# Patient Record
Sex: MALE | Race: WHITE | NOT HISPANIC OR LATINO | Employment: OTHER | URBAN - NONMETROPOLITAN AREA
[De-identification: names, ages, dates, MRNs, and addresses within clinical notes are randomized per-mention and may not be internally consistent; named-entity substitution may affect disease eponyms.]

---

## 2021-11-12 ENCOUNTER — TRANSCRIBE ORDERS (OUTPATIENT)
Dept: LAB | Facility: HOSPITAL | Age: 71
End: 2021-11-12

## 2021-11-12 DIAGNOSIS — Z01.818 OTHER SPECIFIED PRE-OPERATIVE EXAMINATION: Primary | ICD-10-CM

## 2021-11-15 ENCOUNTER — LAB (OUTPATIENT)
Dept: LAB | Facility: HOSPITAL | Age: 71
End: 2021-11-15

## 2021-11-15 DIAGNOSIS — Z01.818 OTHER SPECIFIED PRE-OPERATIVE EXAMINATION: ICD-10-CM

## 2021-11-15 LAB — SARS-COV-2 RNA PNL SPEC NAA+PROBE: NOT DETECTED

## 2021-11-15 PROCEDURE — C9803 HOPD COVID-19 SPEC COLLECT: HCPCS

## 2021-11-15 PROCEDURE — U0004 COV-19 TEST NON-CDC HGH THRU: HCPCS | Performed by: OPHTHALMOLOGY

## 2021-11-24 ENCOUNTER — TRANSCRIBE ORDERS (OUTPATIENT)
Dept: ADMINISTRATIVE | Facility: HOSPITAL | Age: 71
End: 2021-11-24

## 2021-11-24 DIAGNOSIS — Z01.818 PRE-OPERATIVE CLEARANCE: Primary | ICD-10-CM

## 2021-11-29 ENCOUNTER — LAB (OUTPATIENT)
Dept: LAB | Facility: HOSPITAL | Age: 71
End: 2021-11-29

## 2021-11-29 DIAGNOSIS — Z01.818 PRE-OPERATIVE CLEARANCE: ICD-10-CM

## 2021-11-29 LAB — SARS-COV-2 RNA PNL SPEC NAA+PROBE: NOT DETECTED

## 2021-11-29 PROCEDURE — U0004 COV-19 TEST NON-CDC HGH THRU: HCPCS | Performed by: OPHTHALMOLOGY

## 2021-11-29 PROCEDURE — C9803 HOPD COVID-19 SPEC COLLECT: HCPCS

## 2025-05-08 ENCOUNTER — OFFICE VISIT (OUTPATIENT)
Dept: UROLOGY | Facility: CLINIC | Age: 75
End: 2025-05-08
Payer: MEDICARE

## 2025-05-08 VITALS
HEART RATE: 76 BPM | HEIGHT: 72 IN | DIASTOLIC BLOOD PRESSURE: 71 MMHG | SYSTOLIC BLOOD PRESSURE: 138 MMHG | WEIGHT: 177.6 LBS | BODY MASS INDEX: 24.06 KG/M2

## 2025-05-08 DIAGNOSIS — R97.20 ELEVATED PROSTATE SPECIFIC ANTIGEN (PSA): Primary | ICD-10-CM

## 2025-05-08 RX ORDER — SODIUM PHOSPHATE,MONO-DIBASIC 19G-7G/118
ENEMA (ML) RECTAL
Qty: 1 ENEMA | Refills: 0 | Status: SHIPPED | OUTPATIENT
Start: 2025-05-08

## 2025-05-08 RX ORDER — DIAZEPAM 10 MG/1
TABLET ORAL
Qty: 2 TABLET | Refills: 0 | Status: SHIPPED | OUTPATIENT
Start: 2025-05-08

## 2025-05-08 RX ORDER — CLINDAMYCIN HYDROCHLORIDE 300 MG/1
300 CAPSULE ORAL 2 TIMES DAILY
Qty: 6 CAPSULE | Refills: 0 | Status: SHIPPED | OUTPATIENT
Start: 2025-05-08 | End: 2025-05-11

## 2025-05-08 RX ORDER — CIPROFLOXACIN 500 MG/1
TABLET, FILM COATED ORAL
Qty: 4 TABLET | Refills: 0 | Status: SHIPPED | OUTPATIENT
Start: 2025-05-08

## 2025-05-08 NOTE — PROGRESS NOTES
"Chief Complaint:    Chief Complaint   Patient presents with    Elevated PSA     New patient        Vital Signs:   /71 (BP Location: Right arm, Patient Position: Sitting, Cuff Size: Large Adult)   Pulse 76   Ht 182.9 cm (72\")   Wt 80.6 kg (177 lb 9.6 oz)   BMI 24.09 kg/m²   Body mass index is 24.09 kg/m².      HPI:  Den[place] Clayton Eaton is a 74 y.o. male who presents today for initial evaluation     History of Present Illness  Mr. Jerome presents to the clinic today for evaluation of an elevated PSA.  He has been referred to us by Dr. Zoraida Miranda MD.  Patient reports that in October 2024 and went to his primary care provider due to left-sided leg pain and she completed blood work at that time including a PSA that did come back elevated at 7.3.  He did not believe it had been elevated previously but is unsure.  Did have a repeat PSA recently on 3/31/2024 that it increased further to 8.9.  He reports that his leg pain has improved.  Currently has an IPSS score 3.  He only gets up 1 time throughout the night endorses a slight weak stream with urgency.  Overall he is mostly satisfied with slow urinary tract symptoms.  He denies any fever, chills, unexplained weight loss, or gross hematuria/hematospermia.  He denies any known family history of prostate, ovarian, breast, or cervical carcinoma.      Past Medical History:  Past Medical History:   Diagnosis Date    Elevated PSA        Current Meds:  Current Outpatient Medications   Medication Sig Dispense Refill    ciprofloxacin (Cipro) 500 MG tablet Take one tablet twice a day before procedure 4 tablet 0    clindamycin (CLEOCIN) 300 MG capsule Take 1 capsule by mouth 2 (Two) Times a Day for 3 days. Start after procedure 6 capsule 0    diazePAM (VALIUM) 10 MG tablet One tablet night before procedure at bedtime and one morning of one hour prior to procedure 2 tablet 0    Sodium Phosphates ([employer] Enema Disposable) 19-7 GM/118ML enema Use morning of the procedure 1 " enema 0     No current facility-administered medications for this visit.        Allergies:   No Known Allergies     Past Surgical History:  Past Surgical History:   Procedure Laterality Date    SKIN CANCER EXCISION         Social History:  Social History     Socioeconomic History    Marital status: Single   Tobacco Use    Smoking status: Every Day     Types: Cigarettes    Smokeless tobacco: Current     Types: Snuff   Vaping Use    Vaping status: Never Used   Substance and Sexual Activity    Alcohol use: Not Currently    Drug use: Never    Sexual activity: Defer       Family History:  Family History   Problem Relation Age of Onset    No Known Problems Father     No Known Problems Mother        Review of Systems:  Review of Systems   Constitutional:  Negative for chills, fatigue, fever and unexpected weight change.   HENT:  Negative for congestion and sinus pressure.    Respiratory:  Negative for chest tightness and shortness of breath.    Cardiovascular:  Negative for chest pain.   Gastrointestinal:  Negative for abdominal pain, constipation, diarrhea, nausea and vomiting.   Genitourinary:  Negative for difficulty urinating, dysuria, flank pain, frequency, hematuria, penile swelling, scrotal swelling and urgency.   Musculoskeletal:  Negative for back pain and neck pain.   Skin:  Negative for rash.   Allergic/Immunologic: Negative for food allergies.   Neurological:  Negative for dizziness and headaches.   Hematological:  Does not bruise/bleed easily.   Psychiatric/Behavioral:  Negative for confusion and suicidal ideas. The patient is nervous/anxious.        Physical Exam:  Physical Exam  Constitutional:       General: He is not in acute distress.     Appearance: Normal appearance.   HENT:      Head: Normocephalic and atraumatic.      Nose: Nose normal.      Mouth/Throat:      Mouth: Mucous membranes are moist.   Eyes:      Conjunctiva/sclera: Conjunctivae normal.   Cardiovascular:      Rate and Rhythm: Normal rate.       Pulses: Normal pulses.   Pulmonary:      Effort: Pulmonary effort is normal.   Abdominal:      Palpations: Abdomen is soft.   Genitourinary:     Comments: Enlarged prostate with hardness to palpation on the right side.  Concerning for mass.  Musculoskeletal:         General: Normal range of motion.      Cervical back: Normal range of motion.   Skin:     General: Skin is warm.   Neurological:      General: No focal deficit present.      Mental Status: He is alert and oriented to person, place, and time.   Psychiatric:         Mood and Affect: Mood normal.         Behavior: Behavior normal.         Thought Content: Thought content normal.         Judgment: Judgment normal.         IPSS Questionnaire (AUA-7):  IPSS Questionnaire (AUA-7):                  IPSS Questionnaire (AUA-7):  Over the past month…    1)  Incomplete Emptying  How often have you had a sensation of not emptying your bladder?  0 - Not at all   2)  Frequency  How often have you had to urinate less than every two hours? 1 - Less than 1 time in 5   3)  Intermittency  How often have you found you stopped and started again several times when you urinated?  0 - Not at all   4) Urgency  How often have you found it difficult to postpone urination?  0 - Not at all   5) Weak Stream  How often have you had a weak urinary stream?  1 - Less than 1 time in 5   6) Straining  How often have you had to push or strain to begin urination?  0 - Not at all   7) Nocturia  How many times did you typically get up at night to urinate?  1 - 1 time   Total Score:  3   The International Prostate Symptom Score (IPSS) is used to screen, diagnose, track symptoms of benign prostatic hyperplasia (BPH).    0-7 pts (Mild Symptoms)  / 8-19 pts (Moderate) / 20-35 (Severe)    Quality of life due to urinary symptoms:  If you were to spend the rest of your life with your urinary condition the way it is now, how would you feel about that? 2-Mostly Satisfied   Urine Leakage  (Incontinence) 0-No Leakage       Recent Image (CT and/or KUB):   CT Abdomen and Pelvis: No results found for this or any previous visit.     CT Stone Protocol: No results found for this or any previous visit.     KUB: No results found for this or any previous visit.       Labs:  Brief Urine Lab Results       None          No visits with results within 3 Month(s) from this visit.   Latest known visit with results is:   Lab on 11/29/2021   Component Date Value Ref Range Status    COVID19 11/29/2021 Not Detected  Not Detected - Ref. Range Final        Procedure: None  Procedures     I have reviewed and agree with the above PMH, PSH, FMH, social history, medications, allergies, and labs.     Assessment/Plan:   Problem List Items Addressed This Visit       Elevated prostate specific antigen (PSA) - Primary    Relevant Medications    Sodium Phosphates ([employer] Enema Disposable) 19-7 GM/118ML enema    diazePAM (VALIUM) 10 MG tablet    clindamycin (CLEOCIN) 300 MG capsule    ciprofloxacin (Cipro) 500 MG tablet       Health Maintenance:   Health Maintenance Due   Topic Date Due    Pneumococcal Vaccine 50+ (1 of 2 - PCV) Never done    TDAP/TD VACCINES (1 - Tdap) Never done    COLORECTAL CANCER SCREENING  Never done    ZOSTER VACCINE (1 of 2) Never done    AAA SCREEN ONCE  Never done    COVID-19 Vaccine (1 - 2024-25 season) Never done    HEPATITIS C SCREENING  Never done    ANNUAL WELLNESS VISIT  Never done        Smoking Counseling: Everyday smoker.  Current user of smokeless tobacco.  Counseling given having already quit at this time.    Urine Incontinence: Patient reports that he is not currently experiencing any symptoms of urinary incontinence.    Patient was given instructions and counseling regarding his condition or for health maintenance advice. Please see specific information pulled into the AVS if appropriate.    Patient Education:   Elevated PSA -discussed with the patient the pathophysiology of this condition in  detail.  Patient has had persistent elevation in his PSA and increased 0.9.  On digital rectal exam he had an very hard mass on the right posterior aspect of the prostate concerning for carcinoma.  Did advise patient given persistent increase in rise this is concerning for prostate cancer would recommend a biopsy.  Did discuss the nature of a biopsy in office including procedure.  Did discuss the use of antibiotics for prophylaxis, enema to clean out the rectal vault, and Valium for preop use for anxiety and nerves.  Patient is not currently on any blood thinners or anticoagulants.  He does wish to proceed forward with the biopsy in office and we will get him scheduled next week.    Visit Diagnoses:    ICD-10-CM ICD-9-CM   1. Elevated prostate specific antigen (PSA)  R97.20 790.93     A total of 30 minutes were spent coordinating this patient’s care in clinic today; 20 minutes of which were face-to-face with the patient, reviewing medical history and counseling on the current treatment and followup plan.  All questions were answered to patient's satisfaction.    Meds Ordered During Visit:  New Medications Ordered This Visit   Medications    Sodium Phosphates ([employer] Enema Disposable) 19-7 GM/118ML enema     Sig: Use morning of the procedure     Dispense:  1 enema     Refill:  0    diazePAM (VALIUM) 10 MG tablet     Sig: One tablet night before procedure at bedtime and one morning of one hour prior to procedure     Dispense:  2 tablet     Refill:  0    clindamycin (CLEOCIN) 300 MG capsule     Sig: Take 1 capsule by mouth 2 (Two) Times a Day for 3 days. Start after procedure     Dispense:  6 capsule     Refill:  0    ciprofloxacin (Cipro) 500 MG tablet     Sig: Take one tablet twice a day before procedure     Dispense:  4 tablet     Refill:  0       Follow Up Appointment: Next week for an office biopsy  No follow-ups on file.      This document has been electronically signed by Jay Villatoro PA-C   May 8, 2025 12:35  EDT    Part of this note may be an electronic transcription/translation of spoken language to printed text using the Dragon Dictation System.

## 2025-05-15 ENCOUNTER — PROCEDURE VISIT (OUTPATIENT)
Dept: UROLOGY | Facility: CLINIC | Age: 75
End: 2025-05-15
Payer: MEDICARE

## 2025-05-15 VITALS
WEIGHT: 176 LBS | BODY MASS INDEX: 23.84 KG/M2 | DIASTOLIC BLOOD PRESSURE: 72 MMHG | HEART RATE: 72 BPM | HEIGHT: 72 IN | SYSTOLIC BLOOD PRESSURE: 125 MMHG

## 2025-05-15 DIAGNOSIS — Z48.816 SURGICAL AFTERCARE, GENITOURINARY SYSTEM: ICD-10-CM

## 2025-05-15 DIAGNOSIS — R97.20 ELEVATED PROSTATE SPECIFIC ANTIGEN (PSA): Primary | ICD-10-CM

## 2025-05-15 RX ORDER — CLINDAMYCIN HYDROCHLORIDE 300 MG/1
CAPSULE ORAL
COMMUNITY
Start: 2025-05-13

## 2025-05-15 RX ORDER — GENTAMICIN 40 MG/ML
80 INJECTION, SOLUTION INTRAMUSCULAR; INTRAVENOUS ONCE
Status: COMPLETED | OUTPATIENT
Start: 2025-05-15 | End: 2025-05-15

## 2025-05-15 RX ADMIN — GENTAMICIN 80 MG: 40 INJECTION, SOLUTION INTRAMUSCULAR; INTRAVENOUS at 09:41

## 2025-05-15 NOTE — PROGRESS NOTES
Chief Complaint:      Chief Complaint   Patient presents with    prostate biopsy        HPI:   74 y.o. male.  Here for biopsy PSA is 8.9  Past Medical History:     Past Medical History:   Diagnosis Date    Elevated PSA        Current Meds:     Current Outpatient Medications   Medication Sig Dispense Refill    ciprofloxacin (Cipro) 500 MG tablet Take one tablet twice a day before procedure 4 tablet 0    clindamycin (CLEOCIN) 300 MG capsule       diazePAM (VALIUM) 10 MG tablet One tablet night before procedure at bedtime and one morning of one hour prior to procedure 2 tablet 0    Sodium Phosphates ([employer] Enema Disposable) 19-7 GM/118ML enema Use morning of the procedure 1 enema 0     No current facility-administered medications for this visit.        Allergies:      No Known Allergies     Past Surgical History:     Past Surgical History:   Procedure Laterality Date    SKIN CANCER EXCISION         Social History:     Social History     Socioeconomic History    Marital status: Single   Tobacco Use    Smoking status: Every Day     Types: Cigarettes     Passive exposure: Current    Smokeless tobacco: Current     Types: Snuff   Vaping Use    Vaping status: Never Used   Substance and Sexual Activity    Alcohol use: Not Currently    Drug use: Never    Sexual activity: Defer       Family History:     Family History   Problem Relation Age of Onset    No Known Problems Father     No Known Problems Mother        Review of Systems:     Review of Systems   Constitutional: Negative.    HENT: Negative.     Eyes: Negative.    Respiratory: Negative.     Cardiovascular: Negative.    Gastrointestinal: Negative.    Endocrine: Negative.    Musculoskeletal: Negative.    Allergic/Immunologic: Negative.    Neurological: Negative.    Hematological: Negative.    Psychiatric/Behavioral: Negative.         Physical Exam:     Physical Exam  Vitals and nursing note reviewed.   Constitutional:       Appearance: He is well-developed.   HENT:       Head: Normocephalic and atraumatic.   Eyes:      Conjunctiva/sclera: Conjunctivae normal.      Pupils: Pupils are equal, round, and reactive to light.   Cardiovascular:      Rate and Rhythm: Normal rate and regular rhythm.      Heart sounds: Normal heart sounds.   Pulmonary:      Effort: Pulmonary effort is normal.      Breath sounds: Normal breath sounds.   Abdominal:      General: Bowel sounds are normal.      Palpations: Abdomen is soft.   Musculoskeletal:         General: Normal range of motion.      Cervical back: Normal range of motion.   Skin:     General: Skin is warm and dry.   Neurological:      Mental Status: He is alert and oriented to person, place, and time.      Deep Tendon Reflexes: Reflexes are normal and symmetric.   Psychiatric:         Behavior: Behavior normal.         Thought Content: Thought content normal.         Judgment: Judgment normal.         I have reviewed the following portions of the patient's history: Allergies, current medications, past family history, past medical history, past social history, past surgical history, problem list, and ROS and confirm it is accurate.    Recent Image (CT and/or KUB):      CT Abdomen and Pelvis: No results found for this or any previous visit.       CT Stone Protocol: No results found for this or any previous visit.       KUB: No results found for this or any previous visit.       Labs (past 3 months):      No visits with results within 3 Month(s) from this visit.   Latest known visit with results is:   Lab on 11/29/2021   Component Date Value Ref Range Status    COVID19 11/29/2021 Not Detected  Not Detected - Ref. Range Final        Procedure:   Prostate ultrasound and biopsy:  74 year-old white male with a history of an elevated PSA and a significant increase in his risk for prostate cancer.  We discussed the prostate biopsy at length.  We discussed the significant risks of anesthesia, bleeding, infection, urosepsis, purported effects on erectile  function, and rectal bleeding requiring surgical intervention.  He was given a pre-procedural enema.  He was pretreated with ciprofloxacin for 3 days.  He was given periprocedural gentamicin at the dose of 80 mg in an intramuscular fashion and given post biopsy clindamycin for 3 days.  Following an extensive informed consent, he was brought to the operative suite, placed in the left lateral decubitus position.  He was given his prophylactic gentamicin.  The rectum was anesthetized with 20 mL of 2% viscous Xylocaine jelly.  I then used the BK biplanar probe inserted into the rectum and made measurements of the prostate.  The height was 4.53 cm, the width was 4.92 cm, and the length was 5.85 cm for a volume of 67.79 g.  There were no obvious hypoechoic areas.  There was no intravesical median lobe.  There was minimal calcification between the transition and peripheral zone.  I then injected 20 mL of 1% Xylocaine in the perirectal area and raised of skin wheal to provide anesthesia after an adequate period of topical anesthesia. I used the Biopty gun to take a total of 10 cores without complication.  He tolerated this extremely well.  Cores.  There was no bleeding or complications.   Impression: Elevated PSA s/p biopsy.    Assessment/Plan:   Elevated prostate specific antigen-we discussed the diagnosis of elevated prostate-specific antigen.  I explained the pathophysiology of PSA.  It is a serine protease that's function in the male reproductive tract is to facilitate the liquefaction of semen.  It is for this reason the body does not want it freely floating in the serum and why typically bound tightly to albumin.  We discussed why we used both a PSA free and total to determine the need for more aggressive therapy. I discussed the normal range.  Additionally, it was in the range of 1 to 4, but more recently has been downgraded to something less than 2 or even approaching 1.  I discussed the risk of family history,  particularly the fact that the average male has a 14% risk of prostate cancer and that in the face of a positive diagnosis in a father it will tablet and any other first-generation relative continued tablet insofar that a father and brother with prostate cancer will produce almost a 50% risk of prostate cancer.  I discussed the use of the temporal use of PSA as the best option for monitoring.  We also discussed the fact that an elevated PSA is an isolated event does not mean that this is prostate cancer and should not engender worry in this regard. I discussed other things that can elevate PSA including constipation, prostatitis, infection, recent intercourse etc., as well as the risks and benefits associated with this.  Also discussed the fact that this is with a dilutional test and as a consequence of such were present produce slight variations on a single specimen.  Further discussed the risks and benefits of a prostate biopsy as well.        This document has been electronically signed by MIGUEL CHRIS MD May 15, 2025 08:40 EDT    Dictated Utilizing Dragon Dictation: Part of this note may be an electronic transcription/translation of spoken language to printed text using the Dragon Dictation System.

## 2025-05-16 LAB — REF LAB TEST METHOD: NORMAL

## 2025-05-22 ENCOUNTER — OFFICE VISIT (OUTPATIENT)
Dept: UROLOGY | Facility: CLINIC | Age: 75
End: 2025-05-22
Payer: MEDICARE

## 2025-05-22 VITALS
HEIGHT: 72 IN | SYSTOLIC BLOOD PRESSURE: 133 MMHG | BODY MASS INDEX: 24.27 KG/M2 | DIASTOLIC BLOOD PRESSURE: 72 MMHG | HEART RATE: 80 BPM | WEIGHT: 179.2 LBS

## 2025-05-22 DIAGNOSIS — R97.20 ELEVATED PROSTATE SPECIFIC ANTIGEN (PSA): Primary | ICD-10-CM

## 2025-05-22 NOTE — PROGRESS NOTES
Chief Complaint:      Chief Complaint   Patient presents with    Prostate Biopsy Results       HPI:   74 y.o. male here to discuss his biopsy there were no complications.  He had a 69 g prostate.  He had a negative biopsy.  I will send this for connect test.    Past Medical History:     Past Medical History:   Diagnosis Date    Elevated PSA        Current Meds:     Current Outpatient Medications   Medication Sig Dispense Refill    ciprofloxacin (Cipro) 500 MG tablet Take one tablet twice a day before procedure (Patient not taking: Reported on 5/22/2025) 4 tablet 0    clindamycin (CLEOCIN) 300 MG capsule  (Patient not taking: Reported on 5/22/2025)      diazePAM (VALIUM) 10 MG tablet One tablet night before procedure at bedtime and one morning of one hour prior to procedure (Patient not taking: Reported on 5/22/2025) 2 tablet 0    Sodium Phosphates ([facility] Enema Disposable) 19-7 GM/118ML enema Use morning of the procedure (Patient not taking: Reported on 5/22/2025) 1 enema 0     No current facility-administered medications for this visit.        Allergies:      No Known Allergies     Past Surgical History:     Past Surgical History:   Procedure Laterality Date    SKIN CANCER EXCISION         Social History:     Social History     Socioeconomic History    Marital status: Single   Tobacco Use    Smoking status: Every Day     Types: Cigarettes     Passive exposure: Current    Smokeless tobacco: Current     Types: Snuff   Vaping Use    Vaping status: Never Used   Substance and Sexual Activity    Alcohol use: Not Currently    Drug use: Never    Sexual activity: Defer       Family History:     Family History   Problem Relation Age of Onset    No Known Problems Father     No Known Problems Mother        Review of Systems:     Review of Systems   Constitutional: Negative.    HENT: Negative.     Eyes: Negative.    Respiratory: Negative.     Cardiovascular: Negative.    Gastrointestinal: Negative.    Endocrine: Negative.     Musculoskeletal: Negative.    Allergic/Immunologic: Negative.    Neurological: Negative.    Hematological: Negative.    Psychiatric/Behavioral: Negative.         Physical Exam:     Physical Exam  Vitals and nursing note reviewed.   Constitutional:       Appearance: He is well-developed.   HENT:      Head: Normocephalic and atraumatic.   Eyes:      Conjunctiva/sclera: Conjunctivae normal.      Pupils: Pupils are equal, round, and reactive to light.   Cardiovascular:      Rate and Rhythm: Normal rate and regular rhythm.      Heart sounds: Normal heart sounds.   Pulmonary:      Effort: Pulmonary effort is normal.      Breath sounds: Normal breath sounds.   Abdominal:      General: Bowel sounds are normal.      Palpations: Abdomen is soft.   Musculoskeletal:         General: Normal range of motion.      Cervical back: Normal range of motion.   Skin:     General: Skin is warm and dry.   Neurological:      Mental Status: He is alert and oriented to person, place, and time.      Deep Tendon Reflexes: Reflexes are normal and symmetric.   Psychiatric:         Behavior: Behavior normal.         Thought Content: Thought content normal.         Judgment: Judgment normal.         I have reviewed the following portions of the patient's history: Allergies, current medications, past family history, past medical history, past social history, past surgical history, problem list, and ROS and confirm it is accurate.    Recent Image (CT and/or KUB):      CT Abdomen and Pelvis: No results found for this or any previous visit.       CT Stone Protocol: No results found for this or any previous visit.       KUB: No results found for this or any previous visit.       Labs (past 3 months):      Procedure visit on 05/15/2025   Component Date Value Ref Range Status    Reference Lab Report 05/15/2025    Final                    Value:[facility]  [address] [place] [address]  Phone: [phone] or  [phone]  Fax: [phone]  Eduardo Braxton M.D., Medical Director    PATIENT NAME                                     LABORATORY NO.  [address] [place] [address].                           [id number]  [id number]                                 AGE                    SEX   N              CLIENT REF #  [facility]                             74        1950           [id number]      [id number]    GASTROENTEROLOGY & UROLOGY                 REQUESTING M.D.           ATTENDING M.D.         COPY TO.  [place] CHRIS,  [address] [place], [place] [address] [address]                           DATE COLLECTED            DATE RECEIVED          DATE REPORTED  05/15/2025                05/15/2025             2025    DIAGNOSIS:  A.     PROSTATE, NEEDLE CORE BIOPSY, RIGHT MID:  Benign prostatic tissue  B.                               PROSTATE, NEEDLE CORE BIOPSY, RIGHT BASE:  Benign prostatic tissue with acute and chronic inflammation  C.     PROSTATE, NEEDLE CORE BIOPSY, RIGHT LATERAL MID:  Benign prostatic tissue with chronic inflammation  D.     PROSTATE, NEEDLE CORE BIOPSY, RIGHT LATERAL BASE:  Benign prostatic tissue with acute and chronic inflammation and atrophic change  E.     PROSTATE, NEEDLE CORE BIOPSY, LEFT MID:  Benign prostatic tissue with focal acute and chronic inflammation and  atrophic change  F.     PROSTATE, NEEDLE CORE BIOPSY, LEFT BASE:  Benign prostatic tissue  G.     PROSTATE, NEEDLE CORE BIOPSY, LEFT LATERAL MID:  Benign prostatic tissue with acute and chronic inflammation and atrophic change  H.     PROSTATE, NEEDLE CORE BIOPSY, LEFT LATERAL BASE:  Benign prostatic tissue    CAM    CLINICAL HISTORY:  Elevated prostate specific antigen (PSA)    SPECIMENS RECEIVED:  A.    PROSTATE, NEEDLE CORE BIOPSY, RIGHT MID  B.    PROSTATE, NEEDLE CORE BIOPSY, RIGHT BASE  C.    PROSTATE, NEEDLE CORE                            "BIOPSY, RIGHT LATERAL MID  D.    PROSTATE, NEEDLE CORE BIOPSY, RIGHT LATERAL BASE  E.    PROSTATE, NEEDLE CORE BIOPSY, LEFT MID  F.    PROSTATE, NEEDLE CORE BIOPSY, LEFT BASE  G.    PROSTATE, NEEDLE CORE BIOPSY, LEFT LATERAL MID  H.    PROSTATE, NEEDLE CORE BIOPSY, LEFT LATERAL BASE    MICROSCOPIC DESCRIPTION:  Tissue blocks are prepared and slides are examined microscopically on all  specimens. See diagnosis for details.    Professional interpretation rendered by Bharati Fletcher M.D., MAGDE at  [facility], [address] [place] [address].    GROSS DESCRIPTION:  A.    Labeled \"right mid\", consisting of 1 tan needle core measuring 1.3 x 0.1 x  0.1 cm, submitted entirely in 1 cassette.  SOG  B.    Labeled \"right base\", consisting of 1 tan needle core measuring 1.5 x 0.1 x  0.1 cm, submitted entirely 1 cassette.  C.    Labeled \"right lateral mid\", consisting of 1 tan needle core measuring 1.8 x  0.1 x 0.1 cm, submitted entirely in 1 cassette.  D.    Labeled \"right lateral                           base\", consisting of 1 tan needle core measuring 1.9  x 0.1 x 0.1 cm, submitted entirely 1 cassette.  E.    Labeled \"left mid\", consisting of 1 tan needle core measuring 1.6 x 0.1 x  0.1 cm, submitted entirely in 1 cassette.  F.    Labeled \"left base\", consisting of 1 tan needle core measuring 1.7 x 0.1 x  0.1 cm, submitted entirely in 1 cassette.  G.    Labeled \"left lateral mid\", consisting of 1 tan needle core measuring 1.6 x  0.1 x 0.1 cm, submitted entirely in 1 cassette.  H.    Labeled \"left lateral base\", consisting of 1 tan needle core measuring 1.3 x  0.1 x 0.1 cm, submitted entirely in 1 cassette.    REVIEWED, DIAGNOSED AND ELECTRONICALLY  SIGNED BY:    Bharati Fletcher M.D., ARCHIEAParagP.  CPT CODES:  88305x8          Procedure:       Assessment/Plan:   Elevated prostate specific antigen-we discussed the diagnosis of elevated prostate-specific antigen.  I explained the pathophysiology of PSA.  It is a " serine protease that's function in the male reproductive tract is to facilitate the liquefaction of semen.  It is for this reason the body does not want it freely floating in the serum and why typically bound tightly to albumin.  We discussed why we used both a PSA free and total to determine the need for more aggressive therapy. I discussed the normal range.  Additionally, it was in the range of 1 to 4, but more recently has been downgraded to something less than 2 or even approaching 1.  I discussed the risk of family history, particularly the fact that the average male has a 14% risk of prostate cancer and that in the face of a positive diagnosis in a father it will tablet and any other first-generation relative continued tablet insofar that a father and brother with prostate cancer will produce almost a 50% risk of prostate cancer.  I discussed the use of the temporal use of PSA as the best option for monitoring.  We also discussed the fact that an elevated PSA is an isolated event does not mean that this is prostate cancer and should not engender worry in this regard. I discussed other things that can elevate PSA including constipation, prostatitis, infection, recent intercourse etc., as well as the risks and benefits associated with this.  Also discussed the fact that this is with a dilutional test and as a consequence of such were present produce slight variations on a single specimen.  Further discussed the risks and benefits of a prostate biopsy as well.  Status post a negative biopsy will send for connect test will see him back in 6 months            This document has been electronically signed by MIGUEL CHRIS MD May 22, 2025 08:38 EDT    Dictated Utilizing Dragon Dictation: Part of this note may be an electronic transcription/translation of spoken language to printed text using the Dragon Dictation System.

## 2025-06-12 LAB — REF LAB TEST METHOD: NORMAL

## 2025-07-01 ENCOUNTER — TELEPHONE (OUTPATIENT)
Dept: UROLOGY | Facility: CLINIC | Age: 75
End: 2025-07-01
Payer: MEDICARE